# Patient Record
Sex: FEMALE | Race: WHITE | NOT HISPANIC OR LATINO | URBAN - METROPOLITAN AREA
[De-identification: names, ages, dates, MRNs, and addresses within clinical notes are randomized per-mention and may not be internally consistent; named-entity substitution may affect disease eponyms.]

---

## 2018-02-09 ENCOUNTER — COMMUNICATION - HEALTHEAST (OUTPATIENT)
Dept: FAMILY MEDICINE | Facility: CLINIC | Age: 43
End: 2018-02-09

## 2018-02-09 ENCOUNTER — OFFICE VISIT - HEALTHEAST (OUTPATIENT)
Dept: FAMILY MEDICINE | Facility: CLINIC | Age: 43
End: 2018-02-09

## 2018-02-09 DIAGNOSIS — Z13.1 SCREENING FOR DIABETES MELLITUS (DM): ICD-10-CM

## 2018-02-09 DIAGNOSIS — Z12.31 VISIT FOR SCREENING MAMMOGRAM: ICD-10-CM

## 2018-02-09 DIAGNOSIS — Z00.00 ROUTINE GENERAL MEDICAL EXAMINATION AT A HEALTH CARE FACILITY: ICD-10-CM

## 2018-02-09 DIAGNOSIS — Z13.220 SCREENING FOR LIPID DISORDERS: ICD-10-CM

## 2018-02-09 DIAGNOSIS — Z30.011 ENCOUNTER FOR INITIAL PRESCRIPTION OF CONTRACEPTIVE PILLS: ICD-10-CM

## 2018-02-09 DIAGNOSIS — L98.9 SKIN LESION: ICD-10-CM

## 2018-02-09 DIAGNOSIS — Z12.4 SCREENING FOR CERVICAL CANCER: ICD-10-CM

## 2018-02-09 LAB
HCG UR QL: NEGATIVE
SP GR UR STRIP: 1.02 (ref 1–1.03)

## 2018-02-09 ASSESSMENT — MIFFLIN-ST. JEOR: SCORE: 1296.77

## 2018-02-12 ENCOUNTER — COMMUNICATION - HEALTHEAST (OUTPATIENT)
Dept: FAMILY MEDICINE | Facility: CLINIC | Age: 43
End: 2018-02-12

## 2018-02-12 ENCOUNTER — AMBULATORY - HEALTHEAST (OUTPATIENT)
Dept: LAB | Facility: CLINIC | Age: 43
End: 2018-02-12

## 2018-02-12 DIAGNOSIS — Z13.220 SCREENING FOR LIPID DISORDERS: ICD-10-CM

## 2018-02-12 DIAGNOSIS — Z13.1 SCREENING FOR DIABETES MELLITUS (DM): ICD-10-CM

## 2018-02-12 LAB
CHOLEST SERPL-MCNC: 138 MG/DL
FASTING STATUS PATIENT QL REPORTED: YES
FASTING STATUS PATIENT QL REPORTED: YES
GLUCOSE BLD-MCNC: 95 MG/DL (ref 70–99)
HDLC SERPL-MCNC: 57 MG/DL
HPV SOURCE: NORMAL
HUMAN PAPILLOMA VIRUS 16 DNA: NEGATIVE
HUMAN PAPILLOMA VIRUS 18 DNA: NEGATIVE
HUMAN PAPILLOMA VIRUS FINAL DIAGNOSIS: NORMAL
HUMAN PAPILLOMA VIRUS OTHER HR: NEGATIVE
LDLC SERPL CALC-MCNC: 69 MG/DL
SPECIMEN DESCRIPTION: NORMAL
TRIGL SERPL-MCNC: 59 MG/DL

## 2018-02-14 ENCOUNTER — RECORDS - HEALTHEAST (OUTPATIENT)
Dept: MAMMOGRAPHY | Facility: CLINIC | Age: 43
End: 2018-02-14

## 2018-02-14 DIAGNOSIS — Z12.31 ENCOUNTER FOR SCREENING MAMMOGRAM FOR MALIGNANT NEOPLASM OF BREAST: ICD-10-CM

## 2018-02-15 LAB
BKR LAB AP ABNORMAL BLEEDING: NO
BKR LAB AP BIRTH CONTROL/HORMONES: NORMAL
BKR LAB AP CERVICAL APPEARANCE: NORMAL
BKR LAB AP GYN ADEQUACY: NORMAL
BKR LAB AP GYN INTERPRETATION: NORMAL
BKR LAB AP GYN OTHER FINDINGS: NORMAL
BKR LAB AP HPV REFLEX: NORMAL
BKR LAB AP LMP: NORMAL
BKR LAB AP PATIENT STATUS: NORMAL
BKR LAB AP PREVIOUS ABNORMAL: NO
BKR LAB AP PREVIOUS NORMAL: 2012
HIGH RISK?: NO
PATH REPORT.COMMENTS IMP SPEC: NORMAL
RESULT FLAG (HE HISTORICAL CONVERSION): NORMAL

## 2018-03-19 ENCOUNTER — HOSPITAL ENCOUNTER (OUTPATIENT)
Dept: MAMMOGRAPHY | Facility: CLINIC | Age: 43
Discharge: HOME | End: 2018-03-19
Attending: FAMILY MEDICINE

## 2018-03-19 DIAGNOSIS — N64.89 BREAST ASYMMETRY: ICD-10-CM

## 2019-01-11 ENCOUNTER — COMMUNICATION - HEALTHEAST (OUTPATIENT)
Dept: FAMILY MEDICINE | Facility: CLINIC | Age: 44
End: 2019-01-11

## 2019-04-03 ENCOUNTER — COMMUNICATION - HEALTHEAST (OUTPATIENT)
Dept: FAMILY MEDICINE | Facility: CLINIC | Age: 44
End: 2019-04-03

## 2019-05-28 ENCOUNTER — COMMUNICATION - HEALTHEAST (OUTPATIENT)
Dept: FAMILY MEDICINE | Facility: CLINIC | Age: 44
End: 2019-05-28

## 2019-06-07 ENCOUNTER — OFFICE VISIT - HEALTHEAST (OUTPATIENT)
Dept: FAMILY MEDICINE | Facility: CLINIC | Age: 44
End: 2019-06-07

## 2019-06-07 DIAGNOSIS — G43.829 MENSTRUAL MIGRAINE WITHOUT STATUS MIGRAINOSUS, NOT INTRACTABLE: ICD-10-CM

## 2019-06-07 DIAGNOSIS — Z30.011 ENCOUNTER FOR INITIAL PRESCRIPTION OF CONTRACEPTIVE PILLS: ICD-10-CM

## 2019-06-07 RX ORDER — METOCLOPRAMIDE 10 MG/1
10 TABLET ORAL DAILY PRN
Status: SHIPPED | COMMUNITY
Start: 2019-06-07

## 2019-06-07 RX ORDER — SUMATRIPTAN 50 MG/1
50 TABLET, FILM COATED ORAL
Status: SHIPPED | COMMUNITY
Start: 2019-06-07

## 2019-06-07 ASSESSMENT — MIFFLIN-ST. JEOR: SCORE: 1301.76

## 2020-06-24 ENCOUNTER — COMMUNICATION - HEALTHEAST (OUTPATIENT)
Dept: FAMILY MEDICINE | Facility: CLINIC | Age: 45
End: 2020-06-24

## 2020-06-24 DIAGNOSIS — G43.829 MENSTRUAL MIGRAINE WITHOUT STATUS MIGRAINOSUS, NOT INTRACTABLE: ICD-10-CM

## 2020-06-24 DIAGNOSIS — Z30.011 ENCOUNTER FOR INITIAL PRESCRIPTION OF CONTRACEPTIVE PILLS: ICD-10-CM

## 2020-06-30 ENCOUNTER — COMMUNICATION - HEALTHEAST (OUTPATIENT)
Dept: FAMILY MEDICINE | Facility: CLINIC | Age: 45
End: 2020-06-30

## 2020-06-30 DIAGNOSIS — G43.829 MENSTRUAL MIGRAINE WITHOUT STATUS MIGRAINOSUS, NOT INTRACTABLE: ICD-10-CM

## 2020-06-30 DIAGNOSIS — Z30.011 ENCOUNTER FOR INITIAL PRESCRIPTION OF CONTRACEPTIVE PILLS: ICD-10-CM

## 2020-07-17 ENCOUNTER — COMMUNICATION - HEALTHEAST (OUTPATIENT)
Dept: FAMILY MEDICINE | Facility: CLINIC | Age: 45
End: 2020-07-17

## 2020-07-20 ENCOUNTER — OFFICE VISIT - HEALTHEAST (OUTPATIENT)
Dept: FAMILY MEDICINE | Facility: CLINIC | Age: 45
End: 2020-07-20

## 2020-07-20 DIAGNOSIS — R19.8 ABDOMINAL FULLNESS: ICD-10-CM

## 2020-07-20 DIAGNOSIS — Z13.1 ENCOUNTER FOR SCREENING FOR DIABETES MELLITUS: ICD-10-CM

## 2020-07-20 DIAGNOSIS — Z00.00 ROUTINE GENERAL MEDICAL EXAMINATION AT A HEALTH CARE FACILITY: ICD-10-CM

## 2020-07-20 DIAGNOSIS — Z13.220 ENCOUNTER FOR SCREENING FOR LIPOID DISORDERS: ICD-10-CM

## 2020-07-20 DIAGNOSIS — R14.0 ABDOMINAL BLOATING: ICD-10-CM

## 2020-07-20 DIAGNOSIS — R63.5 WEIGHT GAIN: ICD-10-CM

## 2020-07-20 DIAGNOSIS — Z12.31 VISIT FOR SCREENING MAMMOGRAM: ICD-10-CM

## 2020-07-20 LAB
ALBUMIN SERPL-MCNC: 4.2 G/DL (ref 3.5–5)
ALBUMIN SERPL-MCNC: 4.2 G/DL (ref 3.5–5)
ALP SERPL-CCNC: 36 U/L (ref 45–120)
ALP SERPL-CCNC: 36 U/L (ref 45–120)
ALT SERPL W P-5'-P-CCNC: 30 U/L (ref 0–45)
ALT SERPL W P-5'-P-CCNC: 30 U/L (ref 0–45)
AMYLASE SERPL-CCNC: 61 U/L (ref 5–120)
ANION GAP SERPL CALCULATED.3IONS-SCNC: 10 MMOL/L (ref 5–18)
AST SERPL W P-5'-P-CCNC: 24 U/L (ref 0–40)
AST SERPL W P-5'-P-CCNC: 24 U/L (ref 0–40)
BILIRUB DIRECT SERPL-MCNC: 0.2 MG/DL
BILIRUB SERPL-MCNC: 0.4 MG/DL (ref 0–1)
BILIRUB SERPL-MCNC: 0.4 MG/DL (ref 0–1)
BUN SERPL-MCNC: 11 MG/DL (ref 8–22)
CALCIUM SERPL-MCNC: 9.4 MG/DL (ref 8.5–10.5)
CHLORIDE BLD-SCNC: 105 MMOL/L (ref 98–107)
CHOLEST SERPL-MCNC: 157 MG/DL
CO2 SERPL-SCNC: 22 MMOL/L (ref 22–31)
CREAT SERPL-MCNC: 0.82 MG/DL (ref 0.6–1.1)
ERYTHROCYTE [DISTWIDTH] IN BLOOD BY AUTOMATED COUNT: 11 % (ref 11–14.5)
FASTING STATUS PATIENT QL REPORTED: YES
GFR SERPL CREATININE-BSD FRML MDRD: >60 ML/MIN/1.73M2
GLUCOSE BLD-MCNC: 87 MG/DL (ref 70–125)
HCT VFR BLD AUTO: 40.8 % (ref 35–47)
HDLC SERPL-MCNC: 56 MG/DL
HGB BLD-MCNC: 14 G/DL (ref 12–16)
HIV 1+2 AB+HIV1 P24 AG SERPL QL IA: NEGATIVE
LDLC SERPL CALC-MCNC: 90 MG/DL
LIPASE SERPL-CCNC: 16 U/L (ref 0–52)
MCH RBC QN AUTO: 30.1 PG (ref 27–34)
MCHC RBC AUTO-ENTMCNC: 34.3 G/DL (ref 32–36)
MCV RBC AUTO: 88 FL (ref 80–100)
PLATELET # BLD AUTO: 291 THOU/UL (ref 140–440)
PMV BLD AUTO: 8.2 FL (ref 7–10)
POTASSIUM BLD-SCNC: 4.1 MMOL/L (ref 3.5–5)
PROT SERPL-MCNC: 7 G/DL (ref 6–8)
PROT SERPL-MCNC: 7 G/DL (ref 6–8)
RBC # BLD AUTO: 4.66 MILL/UL (ref 3.8–5.4)
SODIUM SERPL-SCNC: 137 MMOL/L (ref 136–145)
TRIGL SERPL-MCNC: 53 MG/DL
TSH SERPL DL<=0.005 MIU/L-ACNC: 1.59 UIU/ML (ref 0.3–5)
WBC: 7.6 THOU/UL (ref 4–11)

## 2020-07-20 ASSESSMENT — MIFFLIN-ST. JEOR: SCORE: 1352.68

## 2020-07-23 ENCOUNTER — AMBULATORY - HEALTHEAST (OUTPATIENT)
Dept: FAMILY MEDICINE | Facility: CLINIC | Age: 45
End: 2020-07-23

## 2020-07-23 DIAGNOSIS — D21.9 FIBROID: ICD-10-CM

## 2020-07-28 ENCOUNTER — HOSPITAL ENCOUNTER (OUTPATIENT)
Dept: ULTRASOUND IMAGING | Facility: HOSPITAL | Age: 45
Discharge: HOME | End: 2020-07-28
Attending: FAMILY MEDICINE

## 2020-07-28 DIAGNOSIS — D21.9 FIBROID: ICD-10-CM

## 2020-08-03 ENCOUNTER — AMBULATORY - HEALTHEAST (OUTPATIENT)
Dept: FAMILY MEDICINE | Facility: CLINIC | Age: 45
End: 2020-08-03

## 2020-08-03 DIAGNOSIS — D25.9 UTERINE LEIOMYOMA, UNSPECIFIED LOCATION: ICD-10-CM

## 2020-08-20 ENCOUNTER — AMBULATORY - HEALTHEAST (OUTPATIENT)
Dept: SURGERY | Facility: HOSPITAL | Age: 45
End: 2020-08-20

## 2020-08-20 DIAGNOSIS — Z11.59 ENCOUNTER FOR SCREENING FOR OTHER VIRAL DISEASES: ICD-10-CM

## 2020-09-13 ENCOUNTER — VIRTUAL VISIT (OUTPATIENT)
Dept: FAMILY MEDICINE | Facility: OTHER | Age: 45
End: 2020-09-13
Payer: COMMERCIAL

## 2020-09-13 PROCEDURE — 99421 OL DIG E/M SVC 5-10 MIN: CPT | Performed by: PHYSICIAN ASSISTANT

## 2020-09-13 NOTE — PROGRESS NOTES
"Date: 2020 17:21:17  Clinician: Cristofer Coello  Clinician NPI: [id number]  Patient: PEDRO FELIX  Patient : 1975  Patient Address: [address] [place] [address]  Patient Phone: [phone]  Visit Protocol: JANET  Patient Summary:  PEDRO is a 45 year old ( : 1975 ) female who initiated a [facility] Visit for COVID-19 (Coronavirus) evaluation and screening. When asked the question \"Please sign me up to receive news, health information and promotions from [facility].\", PEDRO responded \"Yes\".    PEDRO states her symptoms started 1-2 days ago.   Her symptoms consist of a sore throat and a cough.   Symptom details     Cough: PEDRO coughs a few times an hour and her cough is not more bothersome at night. Phlegm does not come into her throat when she coughs. She believes her cough is caused by post-nasal drip.     Sore throat: PEDRO reports having mild throat pain (1-3 on a 10 point pain scale), does not have exudate on her tonsils, and can swallow liquids. She is not sure if the lymph nodes in her neck are enlarged. A rash has not appeared on the skin since the sore throat started.      PEDRO denies having ear pain, anosmia, facial pain or pressure, fever, vomiting, rhinitis, nausea, wheezing, teeth pain, ageusia, diarrhea, nasal congestion, headache, chills, malaise, and myalgias. She also denies taking antibiotic medication in the past month and having recent facial or sinus surgery in the past 60 days. She is not experiencing dyspnea.   Precipitating events  Within the past week, PEDRO has not been exposed to someone with strep throat. She has not recently been exposed to someone with influenza. PEDRO has not been in close contact with any high risk individuals.   Pertinent COVID-19 (Coronavirus) information  In the past 14 days, PEDRO has not worked in a congregate living setting.   She does not work or volunteer as healthcare worker or a  and does not work or volunteer in a healthcare " facility.   PEDRO also has not lived in a congregate living setting in the past 14 days. She does not live with a healthcare worker.   PEDRO has not had a close contact with a laboratory-confirmed COVID-19 patient within 14 days of symptom onset.   Since December 2019, PEDRO and has not had upper respiratory infection or influenza-like illness. Has not been diagnosed with lab-confirmed COVID-19 test   Pertinent medical history  PEDRO does not get yeast infections when she takes antibiotics.   PEDRO does not need a return to work/school note.   Weight: 144 lbs   PEDRO does not smoke or use smokeless tobacco.   She denies pregnancy and denies breastfeeding. She has menstruated in the past month.   Weight: 144 lbs    MEDICATIONS: levonorgestrel-ethinyl estradiol oral, sumatriptan-naproxen oral, ALLERGIES: Penicillins  Clinician Response:  Dear PEDRO,   Your symptoms show that you may have coronavirus (COVID-19). This illness can cause fever, cough and trouble breathing. Many people get a mild case and get better on their own. Some people can get very sick.  What should I do?  We would like to test you for this virus.   1. Please call [phone] to schedule your visit. Explain that you were referred by [facility] to have a COVID-19 test. Be ready to share your [facility] visit ID number.  The following will serve as your written order for this COVID Test, ordered by me, for the indication of suspected COVID [Z20.828]: The test will be ordered in [facility], our electronic health record, after you are scheduled. It will show as ordered and authorized by Raleigh Ibarra MD.  Order: COVID-19 (Coronavirus) PCR for SYMPTOMATIC testing from [facility].      2. When it's time for your COVID test:  Stay at least 6 feet away from others. (If someone will drive you to your test, stay in the backseat, as far away from the  as you can.)   Cover your mouth and nose with a mask, tissue or washcloth.  Go straight to the testing site. Don't make any stops  "on the way there or back.      3.Starting now: Stay home and away from others (self-isolate) until:   You've had no fever---and no medicine that reduces fever---for one full day (24 hours). And...   Your other symptoms have gotten better. For example, your cough or breathing has improved. And...   At least 10 days have passed since your symptoms started.       During this time, don't leave the house except for testing or medical care.   Stay in your own room, even for meals. Use your own bathroom if you can.   Stay away from others in your home. No hugging, kissing or shaking hands. No visitors.  Don't go to work, school or anywhere else.    Clean \"high touch\" surfaces often (doorknobs, counters, handles, etc.). Use a household cleaning spray or wipes. You'll find a full list of  on the EPA website: www.epa.gov/pesticide-registration/list-n-disinfectants-use-against-sars-cov-2.   Cover your mouth and nose with a mask, tissue or washcloth to avoid spreading germs.  Wash your hands and face often. Use soap and water.  Caregivers in these groups are at risk for severe illness due to COVID-19:  o People 65 years and older  o People who live in a nursing home or long-term care facility  o People with chronic disease (lung, heart, cancer, diabetes, kidney, liver, immunologic)  o People who have a weakened immune system, including those who:   Are in cancer treatment  Take medicine that weakens the immune system, such as corticosteroids  Had a bone marrow or organ transplant  Have an immune deficiency  Have poorly controlled HIV or AIDS  Are obese (body mass index of 40 or higher)  Smoke regularly   o Caregivers should wear gloves while washing dishes, handling laundry and cleaning bedrooms and bathrooms.  o Use caution when washing and drying laundry: Don't shake dirty laundry, and use the warmest water setting that you can.  o For more tips, go to www.cdc.gov/coronavirus/2019-ncov/downloads/10Things.pdf.    How " can I take care of myself?    Get lots of rest. Drink extra fluids (unless a doctor has told you not to).   Take Tylenol (acetaminophen) for fever or pain. If you have liver or kidney problems, ask your family doctor if it's okay to take Tylenol.   Adults can take either:    650 mg (two 325 mg pills) every 4 to 6 hours, or...   1,000 mg (two 500 mg pills) every 8 hours as needed.    Note: Don't take more than 3,000 mg in one day. Acetaminophen is found in many medicines (both prescribed and over-the-counter medicines). Read all labels to be sure you don't take too much.   For children, check the Tylenol bottle for the right dose. The dose is based on the child's age or weight.    If you have other health problems (like cancer, heart failure, an organ transplant or severe kidney disease): Call your specialty clinic if you don't feel better in the next 2 days.       Know when to call 911. Emergency warning signs include:    Trouble breathing or shortness of breath Pain or pressure in the chest that doesn't go away Feeling confused like you haven't felt before, or not being able to wake up Bluish-colored lips or face.  Where can I get more information?   [facility] -- About COVID-19: [email or web address]   CDC -- What to Do If You're Sick: www.cdc.gov/coronavirus/2019-ncov/about/steps-when-sick.html   CDC -- Ending Home Isolation: www.cdc.gov/coronavirus/2019-ncov/hcp/disposition-in-home-patients.html   CDC -- Caring for Someone: www.cdc.gov/coronavirus/2019-ncov/if-you-are-sick/care-for-someone.html   [facility] -- Interim Guidance for Hospital Discharge to Home: www.health.[facility].[place]/diseases/coronavirus/hcp/hospdischarge.pdf   [facility] clinical trials (COVID-19 research studies): [email or web address].[email or web address]/umn-clinical-trials    Below are the COVID-19 hotlines at the [place] Department of Health ([facility]). Interpreters are available.    For health questions: Call 334-735-5405 or 1-671.631.4303  (7 a.m. to 7 p.m.) For questions about schools and childcare: Call 354-884-8789 or 1-931.779.2520 (7 a.m. to 7 p.m.)    Diagnosis: Acute pharyngitis, unspecified  Diagnosis ICD: J02.9

## 2020-09-14 ENCOUNTER — AMBULATORY - HEALTHEAST (OUTPATIENT)
Dept: FAMILY MEDICINE | Facility: CLINIC | Age: 45
End: 2020-09-14

## 2020-09-14 ENCOUNTER — AMBULATORY - HEALTHEAST (OUTPATIENT)
Dept: INTERNAL MEDICINE | Facility: CLINIC | Age: 45
End: 2020-09-14

## 2020-09-14 DIAGNOSIS — Z20.822 SUSPECTED 2019 NOVEL CORONAVIRUS INFECTION: ICD-10-CM

## 2020-09-17 ENCOUNTER — COMMUNICATION - HEALTHEAST (OUTPATIENT)
Dept: SCHEDULING | Facility: CLINIC | Age: 45
End: 2020-09-17

## 2020-09-26 ENCOUNTER — COMMUNICATION - HEALTHEAST (OUTPATIENT)
Dept: FAMILY MEDICINE | Facility: CLINIC | Age: 45
End: 2020-09-26

## 2020-09-26 DIAGNOSIS — Z30.011 ENCOUNTER FOR INITIAL PRESCRIPTION OF CONTRACEPTIVE PILLS: ICD-10-CM

## 2020-09-26 DIAGNOSIS — G43.829 MENSTRUAL MIGRAINE WITHOUT STATUS MIGRAINOSUS, NOT INTRACTABLE: ICD-10-CM

## 2020-10-03 ENCOUNTER — COMMUNICATION - HEALTHEAST (OUTPATIENT)
Dept: FAMILY MEDICINE | Facility: CLINIC | Age: 45
End: 2020-10-03

## 2020-10-19 ENCOUNTER — COMMUNICATION - HEALTHEAST (OUTPATIENT)
Dept: FAMILY MEDICINE | Facility: CLINIC | Age: 45
End: 2020-10-19

## 2020-10-20 ENCOUNTER — OFFICE VISIT - HEALTHEAST (OUTPATIENT)
Dept: FAMILY MEDICINE | Facility: CLINIC | Age: 45
End: 2020-10-20

## 2020-10-20 ENCOUNTER — COMMUNICATION - HEALTHEAST (OUTPATIENT)
Dept: FAMILY MEDICINE | Facility: CLINIC | Age: 45
End: 2020-10-20

## 2020-10-20 DIAGNOSIS — E87.5 HYPERKALEMIA: ICD-10-CM

## 2020-10-20 DIAGNOSIS — Z23 NEED FOR VACCINATION: ICD-10-CM

## 2020-10-20 DIAGNOSIS — Z01.818 PREOPERATIVE EXAMINATION: ICD-10-CM

## 2020-10-20 DIAGNOSIS — D21.9 FIBROID: ICD-10-CM

## 2020-10-20 LAB
CREAT SERPL-MCNC: 0.8 MG/DL (ref 0.6–1.1)
GFR SERPL CREATININE-BSD FRML MDRD: >60 ML/MIN/1.73M2
HGB BLD-MCNC: 15.2 G/DL (ref 12–16)
POTASSIUM BLD-SCNC: 5.2 MMOL/L (ref 3.5–5)

## 2020-10-20 ASSESSMENT — MIFFLIN-ST. JEOR: SCORE: 1381.26

## 2020-10-26 ENCOUNTER — AMBULATORY - HEALTHEAST (OUTPATIENT)
Dept: LAB | Facility: CLINIC | Age: 45
End: 2020-10-26

## 2020-10-26 DIAGNOSIS — E87.5 HYPERKALEMIA: ICD-10-CM

## 2020-10-26 DIAGNOSIS — Z11.59 ENCOUNTER FOR SCREENING FOR OTHER VIRAL DISEASES: ICD-10-CM

## 2020-10-26 LAB — POTASSIUM BLD-SCNC: 4.2 MMOL/L (ref 3.5–5)

## 2020-10-27 ASSESSMENT — MIFFLIN-ST. JEOR: SCORE: 1359.82

## 2020-10-28 ENCOUNTER — ANESTHESIA - HEALTHEAST (OUTPATIENT)
Dept: SURGERY | Facility: HOSPITAL | Age: 45
End: 2020-10-28

## 2020-10-28 ENCOUNTER — COMMUNICATION - HEALTHEAST (OUTPATIENT)
Dept: SCHEDULING | Facility: CLINIC | Age: 45
End: 2020-10-28

## 2020-10-29 ENCOUNTER — SURGERY - HEALTHEAST (OUTPATIENT)
Dept: SURGERY | Facility: HOSPITAL | Age: 45
End: 2020-10-29

## 2021-05-27 NOTE — TELEPHONE ENCOUNTER
RN cannot approve Refill Request    RN can NOT refill this medication overdue for office visits and/or labs.    Sandeep Muhammad, [facility] Connection Triage/Med Refill 4/4/2019    Requested Prescriptions   Pending Prescriptions Disp Refills     norethindrone-ethinyl estradiol (MICROGESTIN FE 1/20) 1 mg-20 mcg (21)/75 mg (7) per tablet 84 tablet 0     Sig: Take 1 tablet by mouth daily.    Oral Contraceptives Protocol Failed - 4/3/2019  3:05 PM       Failed - Visit with PCP or prescribing provider visit in last 12 months     Last office visit with prescriber/PCP: Visit date not found OR same dept: Visit date not found OR same specialty: Visit date not found  Last physical: 2/9/2018 Last MTM visit: Visit date not found   Next visit within 3 mo: Visit date not found  Next physical within 3 mo: Visit date not found  Prescriber OR PCP: Rosaura Mullen MD  Last diagnosis associated with med order: 1. Contraception  - norethindrone-ethinyl estradiol (MICROGESTIN FE 1/20) 1 mg-20 mcg (21)/75 mg (7) per tablet; Take 1 tablet by mouth daily.  Dispense: 84 tablet; Refill: 0    If protocol passes may refill for 12 months if within 3 months of last provider visit (or a total of 15 months).

## 2021-05-27 NOTE — TELEPHONE ENCOUNTER
Rx signed, but haven't seen patient since Feb 2018. Needs appointment before additional refills.    Please assist pt in scheduling physical.

## 2021-05-27 NOTE — TELEPHONE ENCOUNTER
Left message to call back for: stephanie  Information to relay to patient:  Schedule appointment prior to anymore refills

## 2021-05-29 NOTE — TELEPHONE ENCOUNTER
RN cannot approve Refill Request    RN can NOT refill this medication PCP messaged that patient is overdue for Office Visit and Physical .     Jeanne Bae, [facility] Connection Triage/Med Refill 5/28/2019    Requested Prescriptions   Pending Prescriptions Disp Refills     norethindrone-ethinyl estradiol (MICROGESTIN FE 1/20) 1 mg-20 mcg (21)/75 mg (7) per tablet 28 tablet 0     Sig: Take 1 tablet by mouth daily. Needs appointment before additional refills.       Oral Contraceptives Protocol Failed - 5/28/2019  4:23 PM        Failed - Visit with PCP or prescribing provider visit in last 12 months      Last office visit with prescriber/PCP: Visit date not found OR same dept: Visit date not found OR same specialty: Visit date not found  Last physical: 2/9/2018 Last MTM visit: Visit date not found   Next visit within 3 mo: Visit date not found  Next physical within 3 mo: Visit date not found  Prescriber OR PCP: Rosaura Mullen MD  Last diagnosis associated with med order: 1. Contraception  - norethindrone-ethinyl estradiol (MICROGESTIN FE 1/20) 1 mg-20 mcg (21)/75 mg (7) per tablet; Take 1 tablet by mouth daily. Needs appointment before additional refills.  Dispense: 28 tablet; Refill: 0    If protocol passes may refill for 12 months if within 3 months of last provider visit (or a total of 15 months).

## 2021-05-29 NOTE — TELEPHONE ENCOUNTER
1st attempt  Tried to call patient, n/a no way to leave message  Please advise patient as below, needs an appointment with Dr. Mullen before refills will be sent    Julianna Gallardo, CMA

## 2021-05-29 NOTE — TELEPHONE ENCOUNTER
Called - patient notified reason for declining medication refill  and verbalized understanding. Patient scheduled for a follow up appt with Dr. Mullen on Friday 6/7/19 at 11:50 am per her request.    Trish Seay CMA

## 2021-05-29 NOTE — PROGRESS NOTES
"Assessment / Impression     1. Encounter for initial prescription of contraceptive pills  levonorgestrel-ethinyl estradiol (SEASONALE) 0.15 mg-30 mcg (91) per tablet   2. Menstrual migraine without status migrainosus, not intractable  levonorgestrel-ethinyl estradiol (SEASONALE) 0.15 mg-30 mcg (91) per tablet         Plan:     I discussed with patient various contraception options, and that we could try continuous OCPs such as Seasonale.  Discussed use, possible side effects of medication, including small but increased risk of blood clot.  Patient can let me know whether this improves and decreases the frequency of migraine headaches.  In regard to migraine treatment, she can continue Imitrex as needed.    Return in about 6 months (around 12/7/2019) for Annual physical.    Subjective:      HPI: Marilyn Neely is a 44 y.o. female who presents for requesting possible change of OCPs.  She had previously been on Microgestin, though had been off of medication for about 1 month.  She initially had started OCP to see if they would help with her migraines, however, even though she was still getting a period every month, she tended to have migraines usually a few days before or after her menses.  She did not find it actually helping her symptoms by being on OCPs alone, though was wondering if she were on OCP continuously whether this would help her migraines.    She is also participated in some online migraine treatment called \"core\" where she had been given a prescription for sumatriptan 50 mg to use for migraine treatment as needed, though she had taken half a tablet (25 mg) and that alone helped her symptoms anytime she did have a migraine.  For example she did use it twice in the month of May.  She is also given a prescription for metoclopramide to be used as needed for nausea, though she has not needed to use medication.  She did this mostly because she was tired of taking ibuprofen all the time.      Medical History: " "    Patient Active Problem List   Diagnosis     Female Infertility     Acute appendicitis     Acute appendicitis with localized peritonitis     S/P laparoscopic appendectomy       Past Medical History:   Diagnosis Date     Fibrocystic breast      Hx of ovarian cyst      Migraine      Motion sickness        Past Surgical History:   Procedure Laterality Date     APPENDECTOMY        SECTION       LAPAROSCOPIC APPENDECTOMY N/A 2016    Procedure: APPENDECTOMY LAPAROSCOPIC;  Surgeon: Adam Do MD;  Location: [facility] OR;  Service:        Current Medications:     Current Outpatient Medications   Medication Sig     metoclopramide (REGLAN) 10 MG tablet Take 10 mg by mouth daily as needed for nausea.     SUMAtriptan (IMITREX) 50 MG tablet Take 50 mg by mouth every 2 (two) hours as needed.     ibuprofen (ADVIL,MOTRIN) 200 MG tablet Take 400-600 mg by mouth every 6 (six) hours as needed for pain or headaches.     levonorgestrel-ethinyl estradiol (SEASONALE) 0.15 mg-30 mcg (91) per tablet Take 1 tablet by mouth daily.       Family History:     Family History   Problem Relation Age of Onset     Diabetes type II Father      Melanoma Father      Breast cancer Maternal Grandfather         diagnosed later in life     Breast cancer Paternal Aunt         diagnosed in her 50's       Review of Systems  All other systems reviewed and are negative.         Social History:     Social History     Tobacco Use   Smoking Status Never Smoker   Smokeless Tobacco Never Used     Social History     Social History Narrative    , lives with  Waqas and daughter savannah (3).  Works full-time as .           Objective:     /70 (Patient Site: Left Arm, Patient Position: Sitting, Cuff Size: Adult Regular)   Pulse 68   Temp 98.6  F (37  C) (Oral)   Resp 14   Ht 5' 8.4\" (1.737 m)   Wt 133 lb 12.8 oz (60.7 kg)   LMP 2019 (Exact Date)   Breastfeeding? No   BMI 20.11 kg/m    Physical " Examination: General appearance - alert, well appearing, and in no distress  Eyes: pupils equal and reactive, extraocular eye movements intact  Mouth: mucous membranes moist, pharynx normal without lesions  Neck: supple, no significant adenopathy or thyromegaly  Lungs: clear to auscultation, no wheezes, rales or rhonchi, symmetric air entry  Heart: normal rate, regular rhythm, normal S1, S2, no murmurs.  Abdomen: soft, nontender, nondistended, no masses or organomegaly  Neurological: alert, oriented, normal speech, no focal findings or movement disorder noted.  Normal finger-to-nose test bilaterally.  Normal rapid hand movements.  Normal heel-to-shin test bilaterally.  2+ DTRs patellar tendons bilaterally.  Visual fields intact.  Extremities: No edema, no clubbing or cyanosis  Psychiatric: Normal affect. Does not appear anxious or depressed.    No results found for this or any previous visit (from the past 168 hour(s)).      Rosaura Mullen MD  6/7/2019  4:13 PM

## 2021-05-31 VITALS — HEIGHT: 68 IN | BODY MASS INDEX: 20.11 KG/M2 | WEIGHT: 132.7 LBS

## 2021-06-02 VITALS — HEIGHT: 68 IN | WEIGHT: 133.8 LBS | BODY MASS INDEX: 20.28 KG/M2

## 2021-06-04 VITALS
BODY MASS INDEX: 22.23 KG/M2 | DIASTOLIC BLOOD PRESSURE: 76 MMHG | HEIGHT: 69 IN | SYSTOLIC BLOOD PRESSURE: 134 MMHG | OXYGEN SATURATION: 100 % | HEART RATE: 57 BPM | TEMPERATURE: 97 F | WEIGHT: 150.1 LBS | RESPIRATION RATE: 18 BRPM

## 2021-06-04 VITALS
DIASTOLIC BLOOD PRESSURE: 76 MMHG | HEIGHT: 69 IN | BODY MASS INDEX: 21.3 KG/M2 | HEART RATE: 61 BPM | OXYGEN SATURATION: 100 % | WEIGHT: 143.8 LBS | RESPIRATION RATE: 16 BRPM | TEMPERATURE: 98.5 F | SYSTOLIC BLOOD PRESSURE: 122 MMHG

## 2021-06-04 VITALS — WEIGHT: 148 LBS | HEIGHT: 68 IN | BODY MASS INDEX: 22.43 KG/M2

## 2021-06-09 NOTE — TELEPHONE ENCOUNTER
1 month rx signed. Needs appointment before additional refills as she hasn't been seen since June 2019. Please assist patient in scheduling VV appointment.

## 2021-06-09 NOTE — TELEPHONE ENCOUNTER
RN cannot approve Refill Request    RN can NOT refill this medication Protocol failed and NO refill given.       Jeanne Bae, [facility] Triage/Med Refill 6/24/2020    Requested Prescriptions   Pending Prescriptions Disp Refills     levonorgestrel-ethinyl estradiol (SEASONALE) 0.15 mg-30 mcg (91) per tablet 91 tablet 3     Sig: Take 1 tablet by mouth daily.       Oral Contraceptives Protocol Failed - 6/24/2020 10:13 AM        Failed - Visit with PCP or prescribing provider visit in last 12 months      Last office visit with prescriber/PCP: 6/7/2019 Rosaura Mullen MD OR same dept: Visit date not found OR same specialty: 6/7/2019 Rosaura Mullen MD  Last physical: 2/9/2018 Last MTM visit: Visit date not found   Next visit within 3 mo: Visit date not found  Next physical within 3 mo: Visit date not found  Prescriber OR PCP: Rosaura Mullen MD  Last diagnosis associated with med order: 1. Encounter for initial prescription of contraceptive pills  - levonorgestrel-ethinyl estradiol (SEASONALE) 0.15 mg-30 mcg (91) per tablet; Take 1 tablet by mouth daily.  Dispense: 91 tablet; Refill: 3    2. Menstrual migraine without status migrainosus, not intractable  - levonorgestrel-ethinyl estradiol (SEASONALE) 0.15 mg-30 mcg (91) per tablet; Take 1 tablet by mouth daily.  Dispense: 91 tablet; Refill: 3    If protocol passes may refill for 12 months if within 3 months of last provider visit (or a total of 15 months).

## 2021-06-09 NOTE — TELEPHONE ENCOUNTER
FYI - Status Update  Who is Calling: Patient  Update: Patient reports I am scheduled in July and would like medication coverage until then.   Okay to leave a detailed message?:  Yes

## 2021-06-09 NOTE — TELEPHONE ENCOUNTER
Appt. With Dr. Mullen 07/20/2020. Spoke with patient and verified she did get her refill til then.

## 2021-06-09 NOTE — TELEPHONE ENCOUNTER
Declined, rx already signed 6/25. Needs appointment before additional refills.    Please assist patient in scheduling VV or physical appointment if not already scheduled

## 2021-06-09 NOTE — TELEPHONE ENCOUNTER
1st attempt to contact patient      Left message to call back for: Marilyn    Information to relay to patient:  LMTCB    Patient has appt with Dr. Mullen on Monday    Please confirm appt date, time, location, mask and guest policy    Please complete travel screen (located in travel history on appt desk or in the purple shaded area on this encounter)    Please update appt note    Please close this encounter when done    Thank you!

## 2021-06-11 NOTE — TELEPHONE ENCOUNTER
Refill Approved    Rx renewed per Medication Renewal Policy. Medication was last renewed on 6/25/20.    Jeanne Bae, [facility] Triage/Med Refill 9/29/2020     Requested Prescriptions   Pending Prescriptions Disp Refills     levonorgestrel-ethinyl estradiol (SEASONALE) 0.15 mg-30 mcg (91) per tablet [Pharmacy Med Name: LEVONOR/ETH ESTRADIOL TABLETS] 91 tablet 0     Sig: TAKE 1 TABLET BY MOUTH DAILY       Oral Contraceptives Protocol Passed - 9/26/2020  3:32 AM        Passed - Visit with PCP or prescribing provider visit in last 12 months      Last office visit with prescriber/PCP: 6/7/2019 Rosaura Mullen MD OR same dept: Visit date not found OR same specialty: 6/7/2019 Rosaura Mullen MD  Last physical: 7/20/2020 Last MTM visit: Visit date not found   Next visit within 3 mo: Visit date not found  Next physical within 3 mo: Visit date not found  Prescriber OR PCP: Rosaura Mullen MD  Last diagnosis associated with med order: 1. Encounter for initial prescription of contraceptive pills  - levonorgestrel-ethinyl estradiol (SEASONALE) 0.15 mg-30 mcg (91) per tablet [Pharmacy Med Name: LEVONOR/ETH ESTRADIOL TABLETS]; Take 1 tablet by mouth daily.  Dispense: 91 tablet; Refill: 0    2. Menstrual migraine without status migrainosus, not intractable  - levonorgestrel-ethinyl estradiol (SEASONALE) 0.15 mg-30 mcg (91) per tablet [Pharmacy Med Name: LEVONOR/ETH ESTRADIOL TABLETS]; Take 1 tablet by mouth daily.  Dispense: 91 tablet; Refill: 0    If protocol passes may refill for 12 months if within 3 months of last provider visit (or a total of 15 months).

## 2021-06-12 NOTE — PROGRESS NOTES
Proposed Surgery/ Procedure: Hysterectomy   Date of Surgery/ Procedure: 10/29/20  Time of Surgery/ Procedure: unsure  Hospital/Surgical Facility: [facility]  Surgery Fax Number: Note does not need to be faxed, will be available electronically in Epic.  Primary Physician: Malkia Mullen MD  Type of Anesthesia Anticipated: [address], [address]  [facility] [address]  Dept: [phone]  Dept Fax: [phone]  Primary Provider: Malika Mullen MD  Pre-op Performing Provider: MALIKA MULLEN    PREOPERATIVE EVALUATION:  Today's date: 10/20/2020    Marilyn Neely is a 45 y.o. female who presents for a preoperative evaluation.    Surgical Information:  Surgery/Procedure: hysterectomy  Surgery Location: [facility]  Surgeon: Dr. Bah  Surgery Date: 10/29/20  Time of Surgery: unsure  Where patient plans to recover: At home with family  Fax number for surgical facility: Note does not need to be faxed, will be available electronically in Epic.    Type of Anesthesia Anticipated: General    Subjective     HPI related to upcoming procedure: 45 year old female here today for preop exam.  She has fibroids in uterus, with one sitting on bowels, which has made symptoms difficult.  Has decided to go ahead with hysterectomy to alleviate symptoms.    Preop Questions 10/18/2020   Have you ever had a heart attack or stroke? No   Have you ever had surgery on your heart or blood vessels, such as a stent placement, a coronary artery bypass, or surgery on an artery in your head, neck, heart, or legs? No   Do you have chest pain with activity? No   Do you have a history of  heart failure? No   Do you currently have a cold, bronchitis or symptoms of other infection? No   Do you have a cough, shortness of breath, or wheezing? No   Do you or anyone in your family have previous history of blood clots? No   Do you or does anyone in your family have a serious bleeding problem  such as prolonged bleeding following surgeries or cuts? No   Have you ever had problems with anemia or been told to take iron pills? No   Have you had any abnormal blood loss such as black, tarry or bloody stools, or abnormal vaginal bleeding? No   Have you ever had a blood transfusion? No   Are you willing to have a blood transfusion if it is medically needed before, during, or after your surgery? Yes   Have you or any of your relatives ever had problems with anesthesia? YES - significant vomiting after anesthesia   Do you have sleep apnea, excessive snoring or daytime drowsiness? No   Do you have any artifical heart valves or other implanted medical devices like a pacemaker, defibrillator, or continuous glucose monitor? No   Do you have artificial joints? No   Are you allergic to latex? No   Is there any chance that you may be pregnant? No     Health Care Directive:  Patient does not have a Health Care Directive or Living Will: Patient states has Advance Directive and will bring in a copy to clinic.    Preoperative Review of :    reviewed - no record of controlled substances prescribed.    See problem list for active medical problems.  Problems all longstanding and stable, except as noted/documented.  See ROS for pertinent symptoms related to these conditions.      Review of Systems  CONSTITUTIONAL: NEGATIVE for fever, chills, change in weight  INTEGUMENTARY/SKIN: NEGATIVE for worrisome rashes, moles or lesions  EYES: NEGATIVE for vision changes or irritation  ENT/MOUTH: NEGATIVE for ear, mouth and throat problems  RESP: NEGATIVE for significant cough or SOB  BREAST: NEGATIVE for masses, tenderness or discharge  CV: NEGATIVE for chest pain, palpitations or peripheral edema  GI: NEGATIVE for nausea, abdominal pain, heartburn, or change in bowel habits  : NEGATIVE for frequency, dysuria, or hematuria  MUSCULOSKELETAL: NEGATIVE for significant arthralgias or myalgia  NEURO: NEGATIVE for weakness, dizziness or  paresthesias  ENDOCRINE: NEGATIVE for temperature intolerance, skin/hair changes  HEME: NEGATIVE for bleeding problems  PSYCHIATRIC: NEGATIVE for changes in mood or affect    Patient Active Problem List    Diagnosis Date Noted     S/P laparoscopic appendectomy      Acute appendicitis 2016     Acute appendicitis with localized peritonitis      Female Infertility      Past Medical History:   Diagnosis Date     Fibrocystic breast      Hx of ovarian cyst      Migraine      Motion sickness      Past Surgical History:   Procedure Laterality Date     APPENDECTOMY        SECTION       LAPAROSCOPIC APPENDECTOMY N/A 2016    Procedure: APPENDECTOMY LAPAROSCOPIC;  Surgeon: Adam Do MD;  Location: [facility];  Service:      Current Outpatient Medications   Medication Sig Dispense Refill     ibuprofen (ADVIL,MOTRIN) 200 MG tablet Take 400-600 mg by mouth every 6 (six) hours as needed for pain or headaches.       levonorgestrel-ethinyl estradiol (SEASONALE) 0.15 mg-30 mcg (91) per tablet TAKE 1 TABLET BY MOUTH DAILY 91 tablet 3     metoclopramide (REGLAN) 10 MG tablet Take 10 mg by mouth daily as needed for nausea.       SUMAtriptan (IMITREX) 50 MG tablet Take 50 mg by mouth every 2 (two) hours as needed.       No current facility-administered medications for this visit.        Allergies   Allergen Reactions     Penicillins Other (See Comments)     16: Patient unsure of allergy, denies anaphylaxis. Reports tolerating amoxicillin       Social History     Tobacco Use     Smoking status: Never Smoker     Smokeless tobacco: Never Used   Substance Use Topics     Alcohol use: Yes     Comment: 1-3 drinks a week      Family History   Problem Relation Age of Onset     Diabetes type II Father      Melanoma Father      Breast cancer Maternal Grandfather         diagnosed later in life     Breast cancer Paternal Aunt         diagnosed in her 50's     Social History     Substance and Sexual Activity  "  Drug Use No        Objective     /76 (Patient Site: Right Arm, Patient Position: Sitting, Cuff Size: Adult Regular)   Pulse (!) 57   Temp 97  F (36.1  C) (Tympanic)   Resp 18   Ht 5' 8.75\" (1.746 m)   Wt 150 lb 1.6 oz (68.1 kg)   LMP 09/15/2020 (Approximate)   SpO2 100%   Breastfeeding No   BMI 22.33 kg/m    Physical Exam    GENERAL APPEARANCE: healthy, alert and no distress     EYES: EOMI, PERRL     HENT: ear canals and TM's normal and nose and mouth without ulcers or lesions     NECK: no adenopathy, no asymmetry, masses, or scars and thyroid normal to palpation     RESP: lungs clear to auscultation - no rales, rhonchi or wheezes     BREAST: normal without masses, tenderness or nipple discharge and no palpable axillary masses or adenopathy     CV: regular rates and rhythm, normal S1 S2, no S3 or S4 and no murmur, click or rub     ABDOMEN:  soft, nontender, no HSM or masses and bowel sounds normal     MS: extremities normal- no gross deformities noted, no evidence of inflammation in joints, FROM in all extremities.     SKIN: no suspicious lesions or rashes     NEURO: Normal strength and tone, sensory exam grossly normal, mentation intact and speech normal     PSYCH: mentation appears normal. and affect normal/bright     LYMPHATICS: No cervical adenopathy    Recent Labs   Lab Test 07/20/20  1346   HGB 14.0         K 4.1   CREATININE 0.82        PRE-OP Diagnostics:   Recent Results (from the past 168 hour(s))   Hemoglobin    Collection Time: 10/20/20 10:58 AM   Result Value Ref Range    Hemoglobin 15.2 12.0 - 16.0 g/dL   Potassium    Collection Time: 10/20/20 10:58 AM   Result Value Ref Range    Potassium 5.2 (H) 3.5 - 5.0 mmol/L   Creatinine    Collection Time: 10/20/20 10:58 AM   Result Value Ref Range    Creatinine 0.80 0.60 - 1.10 mg/dL    GFR MDRD Af Amer >60 >60 mL/min/1.73m2    GFR MDRD Non Af Amer >60 >60 mL/min/1.73m2     No EKG required, no history of coronary heart disease, " significant arrhythmia, peripheral arterial disease or other structural heart disease.    REVISED CARDIAC RISK INDEX (RCRI)   The patient has the following serious cardiovascular risks for perioperative complications:   - No serious cardiac risks = 0 points    RCRI INTERPRETATION: 0 points: Class I (very low risk - 0.4% complication rate)         Assessment & Plan      The proposed surgical procedure is considered INTERMEDIATE risk.    1. Preoperative examination  - Hemoglobin  - Potassium  - Creatinine    2. Fibroid      3. Hyperkalemia  Given frequent NSAID use, check potassium today which was mildly elevated at 5.2.  Recommend patient hold NSAIDs, will plan to recheck potassium October 26.  As long as it is normal, we will plan to proceed with surgery.    4. Need for vaccination  - Influenza, Seasonal Quad, PF =/> 6months       Risks and Recommendations:  The patient has the following additional risks and recommendations for perioperative complications:   - No identified additional risk factors other than previously addressed    Medication Instructions:  Hold ASA and NSAIDs within 7 days of surgery    RECOMMENDATION:  APPROVAL GIVEN to proceed with proposed procedure pending review of diagnostic evaluation. Recheck potassium 10/26/20.    Signed Electronically by: Rosaura Mullen MD     10/26/20 ADDENDUM: Repeat potassium today now normal at 4.2.  Ok to proceed with surgery.    Rosaura Mullen MD      Copy of this evaluation report is provided to requesting physician.    Preop [facility] Preop Guidelines    Revised Cardiac Risk Index

## 2021-06-12 NOTE — ANESTHESIA POSTPROCEDURE EVALUATION
Patient: Marilyn Neely  Procedure(s):  ROBOTIC HYSTERECTOMY BILATERAL SALPINGECTOMY  CYSTOSCOPY  Anesthesia type: general    Patient location: Phase II Recovery  Last vitals:   Vitals Value Taken Time   /55 10/29/20 1150   Temp 36.4  C (97.6  F) 10/29/20 1150   Pulse 72 10/29/20 1152   Resp 16 10/29/20 1150   SpO2 100 % 10/29/20 1152   Vitals shown include unvalidated device data.  Post vital signs: stable  Level of consciousness: awake and responds to simple questions  Post-anesthesia pain: pain controlled  Post-anesthesia nausea and vomiting: no  Pulmonary: unassisted, return to baseline  Cardiovascular: stable and blood pressure at baseline  Hydration: adequate  Anesthetic events: no    QCDR Measures:  ASA# 11 - Kiana-op Cardiac Arrest: ASA11B - Patient did NOT experience unanticipated cardiac arrest  ASA# 12 - Kiana-op Mortality Rate: ASA12B - Patient did NOT die  ASA# 13 - PACU Re-Intubation Rate: ASA13B - Patient did NOT require a new airway mgmt  ASA# 10 - Composite Anes Safety: ASA10A - No serious adverse event    Additional Notes:

## 2021-06-12 NOTE — ANESTHESIA PREPROCEDURE EVALUATION
Anesthesia Evaluation      History of anesthetic complications (PONV)     Airway   Mallampati: II  Neck ROM: full   Pulmonary - negative ROS    breath sounds clear to auscultation                         Cardiovascular - negative ROS  Exercise tolerance: > or = 4 METS  Rhythm: regular  Rate: normal,         Neuro/Psych      Comments: Migraine headaches    Endo/Other - negative ROS      GI/Hepatic/Renal - negative ROS           Dental - normal exam                        Anesthesia Plan  Planned anesthetic: general endotracheal and total IV anesthesia  Plan: Gen ETT  TIVA given hx of PONV  Pain: Dilaudid and mag infusion  PONV: scopolamine patch, decadron and zofran   ASA 2   Induction: intravenous   Anesthetic plan and risks discussed with: patient  Anesthesia plan special considerations: antiemetics,   Post-op plan: routine recovery

## 2021-06-12 NOTE — ANESTHESIA CARE TRANSFER NOTE
Last vitals:   Vitals:    10/29/20 1040   BP: 111/56   Pulse: 73   Resp: 20   Temp:    SpO2: 100%     Patient's level of consciousness is drowsy  Spontaneous respirations: yes  Maintains airway independently: yes  Dentition unchanged: yes  Oropharynx: oropharynx clear of all foreign objects    QCDR Measures:  ASA# 20 - Surgical Safety Checklist: WHO surgical safety checklist completed prior to induction    PQRS# 430 - Adult PONV Prevention: 4558F - Pt received => 2 anti-emetic agents (different classes) preop & intraop  ASA# 8 - Peds PONV Prevention: NA - Not pediatric patient, not GA or 2 or more risk factors NOT present  PQRS# 424 - Kiana-op Temp Management: 4559F - At least one body temp DOCUMENTED => 35.5C or 95.9F within required timeframe  PQRS# 426 - PACU Transfer Protocol: - Transfer of care checklist used  ASA# 14 - Acute Post-op Pain: ASA14B - Patient did NOT experience pain >= 7 out of 10

## 2021-06-12 NOTE — TELEPHONE ENCOUNTER
Spoke with pt regarding lab results with mildly elevated potassium.  Recommend holding ibuprofen/nsaids now.  Plan to recheck Potassium 10/26.  Please assist patient in scheduling lab appointment for 10/26

## 2021-06-15 NOTE — PROGRESS NOTES
"Assessment:      Healthy female exam.     1. Routine general medical examination at a health care facility     2. Skin lesion  Ambulatory referral to Dermatology   3. Encounter for initial prescription of contraceptive pills  Pregnancy (Beta-hCG, Qual), Urine   4. Screening for lipid disorders  Lipid Stephens FASTING    CANCELED: Lipid Profile   5. Screening for diabetes mellitus (DM)  Glucose   6. Screening for cervical cancer  Gynecologic Cytology (PAP Smear)        Plan:     1. Routine general medical examination at a health care facility    2. Skin lesion  Discussed removal; she would like referral to dermatology for procedure, ordered today  - Ambulatory referral to Dermatology    3. Encounter for initial prescription of contraceptive pills  Counseled patient regarding various birth control options.  She does not have a history of migraine headache with visual aura just migraine headache.  Therefore, will check urine pregnancy test, if this is negative, I will go ahead and fax prescription for Microgestin to try.  Counseled patient on hard to start pack and take medication.  If migraine headaches persist or worsen, recommend discontinuing this medication, could consider progestin only medication to see if this improves her symptoms.  - Pregnancy (Beta-hCG, Qual), Urine    4. Screening for lipid disorders  She is not fasting today, will check at a later date.  - Lipid Stephens FASTING; Future    5. Screening for diabetes mellitus (DM)  Not fasting today, will check a later date.  - Glucose; Future    6. Screening for cervical cancer  Collected, will inform patient of results we have them.  - Gynecologic Cytology (PAP Smear)    7. Mammogram  Ordered today       Await pap smear results.  Mammogram.     Subjective:      Marilyn Neely \"Iona\" is a 43 y.o. female, new to me, hasn't been seen in family medicine in over 3 years, who presents for an annual exam. The patient is sexually active. The patient participates " in regular exercise: yes. The patient reports that there is not domestic violence in her life. Additional concerns:     Birth control: Would like to restart OCP.  Patient does have a history of migraine headache, though does not have visual aura.  She has previously been on OCP, though sometimes she would still experience migraine headaches.  There was discussion about possibly putting her on a minipill, though at that time she decided to try to get pregnant.    Healthy Habits:   Regular Exercise: Yes  Sunscreen Use: Yes  Healthy Diet: Yes  Dental Visits Regularly: Yes  Seat Belt: Yes  Sexually active: Yes  Self Breast Exam Monthly:Yes  Hemoccults: N/A  Flex Sig: N/A  Colonoscopy: N/A  Lipid Profile: not fasting today  Glucose Screen: not fasting today  Prevention of Osteoporosis: N/A  Last Dexa: N/A        Immunization History   Administered Date(s) Administered     Hep B, Adult 2007, 2007     Hep B, historic 2007, 2007     Influenza,[id number],Pandemic,split,IM 2009     Influenza,inj,MDCK,PF,Quad >4yrs 10/03/2017     Tdap 2007, 03/10/2014     Immunization status: up to date and documented.    No exam data present    Gynecologic History  Patient's last menstrual period was 2018 (exact date).  Contraception: none currently  Last Pap: . Results were: normal  Last mammogram: n/a.       OB History    Para Term  AB Living   1 1       SAB TAB Ectopic Multiple Live Births             # Outcome Date GA Lbr Román/2nd Weight Sex Delivery Anes PTL Lv   1 Para                   Current Outpatient Prescriptions   Medication Sig Dispense Refill     ibuprofen (ADVIL,MOTRIN) 200 MG tablet Take 400-600 mg by mouth every 6 (six) hours as needed for pain or headaches.       norethindrone-ethinyl estradiol (MICROGESTIN FE ) 1 mg-20 mcg (21)/75 mg (7) per tablet Take 1 tablet by mouth daily. 84 tablet 3     No current facility-administered medications for this visit.      Past  "Medical History:   Diagnosis Date     Hx of ovarian cyst      Migraine      Motion sickness      Past Surgical History:   Procedure Laterality Date     APPENDECTOMY        SECTION       LAPAROSCOPIC APPENDECTOMY N/A 2016    Procedure: APPENDECTOMY LAPAROSCOPIC;  Surgeon: Adam Do MD;  Location: [facility] Main OR;  Service:      Penicillins  Family History   Problem Relation Age of Onset     Diabetes type II Father      Melanoma Father      Social History     Social History     Marital status:      Spouse name: N/A     Number of children: N/A     Years of education: N/A     Occupational History     Not on file.     Social History Main Topics     Smoking status: Never Smoker     Smokeless tobacco: Never Used     Alcohol use Yes      Comment: 1-3 drinks a week     Drug use: No     Sexual activity: Not Currently     Other Topics Concern     Not on file     Social History Narrative    , lives with  Waqas and daughter savannah (3).  Works full-time as .         Review of Systems  General:  Denies problem  Eyes: Denies problem  Ears/Nose/Throat: Denies problem  Cardiovascular: Denies problem  Respiratory:  Denies problem  Gastrointestinal:  Denies problem  Genitourinary: Denies problem  Musculoskeletal:  Denies problem  Skin: Denies problem  Neurologic: Denies problem  Psychiatric: Denies problem  Endocrine: Denies problem  Heme/Lymphatic: Denies problem   Allergic/Immunologic: Denies problem        Objective:         Vitals:    18 0913   BP: 108/64   Pulse: 60   Resp: 16   Weight: 132 lb 11.2 oz (60.2 kg)   Height: 5' 8.4\" (1.737 m)     Body mass index is 19.94 kg/(m^2).    Physical Exam:  General Appearance: Alert, cooperative, no distress, appears stated age  Head: Normocephalic, without obvious abnormality, atraumatic  Eyes: PERRL, conjunctiva/corneas clear, EOM's intact  Ears: Normal TM's and external ear canals, both ears  Nose: Nares normal, septum " midline,mucosa normal, no drainage  Throat: Lips, mucosa, and tongue normal; teeth and gums normal  Neck: Supple, symmetrical, trachea midline, no adenopathy;  thyroid: not enlarged, symmetric, no tenderness/mass/nodules;   Back: Symmetric, no curvature, ROM normal, no CVA tenderness  Lungs: Clear to auscultation bilaterally, respirations unlabored  Breasts: No breast masses, tenderness, asymmetry, or nipple discharge.  Heart: Regular rate and rhythm, no murmur.   Abdomen: Soft, non-tender, bowel sounds active all four quadrants,  no masses, no organomegaly  Pelvic:Normally developed genitalia with no external lesions or eruptions. Vagina and cervix show no lesions, inflammation, discharge or tenderness.Uterus normal.  No adnexal mass or tenderness.  Extremities: Extremities normal, atraumatic, no cyanosis or edema  Skin: multiple small 1-3mm nevi noted on top of his chest and lower back.  On her right lateral trunk, there is a ~9x5mm hyperpigmented pedunculated lesion.    Lymph nodes: Cervical, supraclavicular, and axillary nodes normal  Neurologic: Alert.   Psych: Normal affect.  Does not appear anxious or depressed.         Rosaura Mullen MD  2/9/2018

## 2021-06-19 ENCOUNTER — HEALTH MAINTENANCE LETTER (OUTPATIENT)
Age: 46
End: 2021-06-19

## 2021-06-23 NOTE — TELEPHONE ENCOUNTER
Refill Approved    Rx renewed per Medication Renewal Policy. Medication was last renewed on last visit 2/9/18.    Hailey Lutz, [facility] Connection Triage/Med Refill 1/11/2019     Requested Prescriptions   Pending Prescriptions Disp Refills     norethindrone-ethinyl estradiol (MICROGESTIN FE 1/20) 1 mg-20 mcg (21)/75 mg (7) per tablet 84 tablet 3     Sig: Take 1 tablet by mouth daily.    Oral Contraceptives Protocol Passed - 1/11/2019 10:26 AM       Passed - Visit with PCP or prescribing provider visit in last 12 months     Last office visit with prescriber/PCP: Visit date not found OR same dept: Visit date not found OR same specialty: Visit date not found  Last physical: 2/9/2018 Last MTM visit: Visit date not found   Next visit within 3 mo: Visit date not found  Next physical within 3 mo: Visit date not found  Prescriber OR PCP: Rosaura Mullen MD  Last diagnosis associated with med order: There are no diagnoses linked to this encounter.  If protocol passes may refill for 12 months if within 3 months of last provider visit (or a total of 15 months).

## 2021-06-29 NOTE — PROGRESS NOTES
"Progress Notes by Rosaura Mullen MD at 7/20/2020  1:00 PM     Author: Rosaura Mullen MD Service: -- Author Type: Physician    Filed: 7/20/2020  1:44 PM Encounter Date: 7/20/2020 Status: Signed    : Rosaura Mullen MD (Physician)       FEMALE PREVENTATIVE EXAM    Assessment and Plan:       1. Routine general medical examination at a health care facility  Discussed one-time HIV screening, ordered today  - HIV Antigen/Antibody Screening Cascade    2. Abdominal fullness  3. Abdominal bloating  Weight gain  Given patient has had approximately 10 pound weight gain as well as ongoing fullness, bloating, and mild tenderness palpation, would recommend we check labs as noted below as well as CT scan of abdomen and pelvis.  Will discuss additional plan once we have these results.  Patient understands, agrees with plan.  - HM2(CBC w/o Differential)  - Hepatic Profile  - Comprehensive Metabolic Panel  - Amylase  - Lipase  - Thyroid Cascade  - CT Abdomen Pelvis With Oral With Without IV Contrast; Future  - CT Abdomen Pelvis With Oral With Without IV Contrast    4. Encounter for screening for diabetes mellitus  5. Encounter for screening for lipoid disorders  Fasting today, will check labs.  - Glucose  - Lipid Cascade- FASTING     6. Visit for screening mammogram:  Ordered mammogram    Next follow up:  Return in about 1 month (around 8/20/2020) for Follow Up.    Immunization Review  Adult Imm Review: No immunizations due today      I discussed the following with the patient:   Adult Healthy Living: Importance of regular exercise  Healthy nutrition        Subjective:   Chief Complaint: Marilyn Neely is an 45 y.o. female here for a preventative health visit.     HPI:  Additional concerns:    Since approximately February 2020, feels abdomen is larger.  Has occasional pain and cramping, feeling bloated \"all the time.\" Tried staying away from acidic foods including garlic. Tried going off wheat and dairy, and didn't feel it " made much impact.  When exercising, feels there's some lightheadedness when doing sit-up for example.  Has been ongoing since February 2020.  Feels she has had about 10lb unexplained weight gain.  Bowel movements are daily; does pay attention to make sure she's getting enough fiber.  No fevers, chills, sweats.          Healthy Habits  Are you taking a daily aspirin? No  Do you typically exercising at least 40 min, 3-4 times per week?  Yes  Do you usually eat at least 4 servings of fruit and vegetables a day, include whole grains and fiber and avoid regularly eating high fat foods? Yes  Have you had an eye exam in the past two years? NO  Do you see a dentist twice per year? Yes  Do you have any concerns regarding sleep? No    Safety Screen  If you own firearms, are they secured in a locked gun cabinet or with trigger locks? The patient does not own any firearms  Do you feel you are safe where you are living?: Yes (7/20/2020 12:55 PM)  Do you feel you are safe in your relationship(s)?: Yes (7/20/2020 12:55 PM)      Review of Systems:  Please see above.  The rest of the review of systems are negative for all systems.     Pap History:   No - age 30-65 PAP every 3 years recommended  Cancer Screening       Status Date      MAMMOGRAM Overdue 3/19/2019      Done 3/19/2018 MAMMO DIAGNOSTIC RIGHT     Patient has more history with this topic...    PAP SMEAR Next Due 2/9/2023      Done 2/9/2018 GYNECOLOGIC CYTOLOGY (PAP SMEAR)     Patient has more history with this topic...          Patient Care Team:  Rosaura Mullen MD as PCP - General (Family Medicine)  Rosaura Mullen MD as Assigned PCP        History     Reviewed By Date/Time Sections Reviewed    Rosaura Mullen MD 7/20/2020  1:16 PM Tobacco, Alcohol, Drug Use, Sexual Activity, Social Documentation    Rosaura Mullen MD 7/20/2020  1:15 PM Medical, Surgical, Family    Julianna Gallardo CMA 7/20/2020 12:56 PM Tobacco            Objective:   Vital Signs:   Visit Vitals  BP  "122/76 (Patient Site: Right Arm, Patient Position: Sitting, Cuff Size: Adult Regular)   Pulse 61   Temp 98.5  F (36.9  C) (Tympanic)   Resp 16   Ht 5' 8.75\" (1.746 m)   Wt 143 lb 12.8 oz (65.2 kg)   LMP 06/22/2020 (Exact Date)   SpO2 100%   Breastfeeding No   BMI 21.39 kg/m           PHYSICAL EXAM  General Appearance: Alert, cooperative, no distress, appears stated age  Head: Normocephalic, without obvious abnormality, atraumatic  Eyes: PERRL, conjunctiva/corneas clear, EOM's intact  Ears: Normal TM's and external ear canals, both ears  Nose: Nares normal, septum midline,mucosa normal, no drainage  Throat: Lips, mucosa, and tongue normal; teeth and gums normal  Neck: Supple, symmetrical, trachea midline, no adenopathy;  thyroid: not enlarged, symmetric, no tenderness/mass/nodules;   Back: Symmetric, no curvature, ROM normal, no CVA tenderness  Lungs: Clear to auscultation bilaterally, respirations unlabored  Breasts: No breast masses, tenderness, asymmetry, or nipple discharge.  Heart: Regular rate and rhythm, no murmur.   Abdomen: Soft, bowel sounds active all four quadrants,  no masses, no organomegaly, mild tenderness to palpation in RLQ, though no rebound or guarding  Pelvic:Normally developed genitalia with no external lesions or eruptions. Vagina and cervix show no lesions, inflammation, discharge or tenderness. No cystocele, No rectocele. Uterus normal.  No adnexal mass or tenderness.  Extremities: Extremities normal, atraumatic, no cyanosis or edema  Skin: Skin color, texture, turgor normal, no rashes or lesions  Lymph nodes: Cervical, supraclavicular, and axillary nodes normal  Neurologic: Alert.   Psych: Normal affect.  Does not appear anxious or depressed.        The 10-year ASCVD risk score (Marcellus SAMIR Jr., et al., 2013) is: 0.4%    Values used to calculate the score:      Age: 45 years      Sex: Female      Is Non- : No      Diabetic: No      Tobacco smoker: No      Systolic Blood " Pressure: 122 mmHg      Is BP treated: No      HDL Cholesterol: 57 mg/dL      Total Cholesterol: 138 mg/dL         Medication List          Accurate as of July 20, 2020  1:43 PM. If you have any questions, ask your nurse or doctor.            CONTINUE taking these medications    ibuprofen 200 MG tablet  Also known as:  ADVIL,MOTRIN  INSTRUCTIONS:  Take 400-600 mg by mouth every 6 (six) hours as needed for pain or headaches.        levonorgestrel-ethinyl estradiol 0.15 mg-30 mcg (91) per tablet  Also known as:  SEASONALE  INSTRUCTIONS:  Take 1 tablet by mouth daily.  Doctor's comments:  Needs appointment before additional refills.        metoclopramide 10 MG tablet  Also known as:  REGLAN  INSTRUCTIONS:  Take 10 mg by mouth daily as needed for nausea.        SUMAtriptan 50 MG tablet  Also known as:  IMITREX  INSTRUCTIONS:  Take 50 mg by mouth every 2 (two) hours as needed.               Additional Screenings Completed Today:

## 2021-07-03 NOTE — ADDENDUM NOTE
Addendum Note by Mckayla Giron, RN at 8/20/2020  7:15 AM     Author: Mckayla Giron RN Service: -- Author Type: Registered Nurse    Filed: 9/9/2020  9:32 AM Encounter Date: 8/20/2020 Status: Signed    : Mckayla Giron RN (Registered Nurse)    Addended by: MCKAYLA GIRON on: 9/9/2020 09:32 AM        Modules accepted: Orders

## 2021-10-09 ENCOUNTER — HEALTH MAINTENANCE LETTER (OUTPATIENT)
Age: 46
End: 2021-10-09

## 2021-12-04 ENCOUNTER — E-VISIT (OUTPATIENT)
Dept: INTERNAL MEDICINE | Facility: CLINIC | Age: 46
End: 2021-12-04
Payer: COMMERCIAL

## 2021-12-04 DIAGNOSIS — Z20.822 CLOSE EXPOSURE TO 2019 NOVEL CORONAVIRUS: Primary | ICD-10-CM

## 2021-12-04 PROCEDURE — 99421 OL DIG E/M SVC 5-10 MIN: CPT | Performed by: PHYSICIAN ASSISTANT

## 2021-12-04 NOTE — PATIENT INSTRUCTIONS
"  Dear Marilyn Neely,    Based on your exposure to COVID-19 (coronavirus), we would like to test you for this virus. I have placed an order for this test.The best time for testing is 5-7 days after the exposure.    How to schedule:  Go to your [facility] home page and scroll down to the section that says  You have an appointment that needs to be scheduled  and click the large green button that says  Schedule Now  and follow the steps to find the next available opening.     If you are unable to complete these [facility] scheduling steps, please call [phone] to schedule your testing.     Return to work/school/ guidance:   For people with high risk exposures outside the home    Please let your workplace manager and staffing office know when your quarantine ends.     We can not give you an exact date as it depends on the information below. You can calculate this on your own or work with your manager/staffing office to calculate this. (For example if you were exposed on 10/4, you would have to quarantine for 14 full days. That would be through 10/18. You could return on 10/19.)    Quarantine Guidelines:  Patients (\"contacts\") who have been in close prolonged contact of an infected person(s) (within six feet for at least 15 minutes within a 24 hour period), and remain asymptomatic should enter quarantine based on the following options:    14-day quarantine period (this remains the CDC recommendation for the greatest protection against spread of COVID-19) OR    Minimum 7-day quarantine with negative RT-PCR test collected on day 5 or later OR    10-day quarantine with no test  Quarantine Guideline exceptions are as follows:    People who have been fully vaccinated do not need to quarantine if the exposure was at least 2 weeks after the last vaccination. This includes vaccinated health care workers.    Not fully vaccinated and unvaccinated Individuals who work in health care, congregate care, or congregate living " should be off work for 14 days from their last date of exposure. Community activities for this group can be resumed based on options above. Fully vaccinated individuals in this group do not need to quarantine from work after exposure.    Not fully vaccinated and unvaccinated people whose high-risk exposure was a household member should always quarantine for 14 days from their last date of exposure. Fully vaccinated people in this category do not need to quarantine.    Not fully vaccinated or unvaccinated residents of congregate care and congregate living settings should always quarantine for 14 days from their last date of exposure. Fully vaccinated residents do not need to quarantine.  Note: If you have ongoing exposure to the covid positive person, this quarantine period may be more than 14 days. (For example, if you are continued to be exposed to your child who tested positive and cannot isolate from them, then the quarantine of 7-14 days can't start until your child is no longer contagious. This is typically 10 days from onset of the child's symptoms. So the total duration may be 17-24 days in this case.)    You should continue symptom monitoring until day 14 post-exposure. If you develop signs or symptoms of COVID-19, isolate and get tested (even if you have been tested already).    How to quarantine:   Stay home and away from others. Don't go to school or anywhere else. Generally quarantine means staying home from work but there are some exceptions to this. Please contact your workplace.  No hugging, kissing or shaking hands.  Don't let anyone visit.  Cover your mouth and nose with a mask, tissue or washcloth to avoid spreading germs.  Wash your hands and face often. Use soap and water.    What are the symptoms of COVID-19?  The most common symptoms are cough, fever and trouble breathing. Less common symptoms include headache, body aches, fatigue (feeling very tired), chills, sore throat, stuffy or runny nose,  diarrhea (loose poop), loss of taste or smell, belly pain, and nausea or vomiting (feeling sick to your stomach or throwing up).  After 14 days, if you have still don't have symptoms, you likely don't have this virus.  If you develop symptoms, follow these guidelines.  If you're normally healthy: Please start another eVisit.  If you have a serious health problem (like cancer, heart failure, an organ transplant or kidney disease): Call your specialty clinic. Let them know that you might have COVID-19.    Where can I get more information?  [facility] - About COVID-19: [email or web address]  CDC - What to Do If You're Sick: www.cdc.gov/coronavirus/2019-ncov/about/steps-when-sick.html  CDC - Ending Home Isolation: www.cdc.gov/coronavirus/2019-ncov/hcp/disposition-in-home-patients.html  CDC - Caring for Someone: www.cdc.gov/coronavirus/2019-ncov/if-you-are-sick/care-for-someone.html  [facility] clinical trials (COVID-19 research studies): clinicalaffairs.[facility]-clinical-trials  Below are the COVID-19 hotlines at the [facility] ([facility]). Interpreters are available.  For health questions: Call 546-542-2783 or 1-575.342.7645 (7 a.m. to 7 p.m.)  For questions about schools and childcare: Call 055-108-8969 or 1-762.272.8824 (7 a.m. to 7 p.m.)        December 4, 2021  RE:  Marilyn Neely                                                                                                                   [address] [address]      To whom it may concern:    I evaluated Marilyn Neely on December 4, 2021. Marilyn Neely should be excused from work/school.    They should let their workplace manager and staffing office know when their quarantine ends.    We can not give an exact date as it depends on the information below. They can calculate this on their own or work with their manager/staffing office to calculate this. (For example if they were exposed on 10/04, they  "would have to quarantine for 14 full days. That would be through 10/18. They could return on 10/19.)    Quarantine Guidelines:    Patients (\"contacts\") who have been in close prolonged contact of an infected person(s) (within six feet for at least 15 minutes within a 24 hour period) and remain asymptomatic should enter quarantine based on the following options:      14-day quarantine period (this remains the CDC recommendation for the greatest protection against spread of COVID-19) OR    Minimum 7-day quarantine with negative RT-PCR test collected on day 5 or later OR    10-day quarantine with no test   Quarantine Guideline exceptions are as follows:    People who have been fully vaccinated do not need to quarantine if the exposure was at least 2 weeks after the last vaccination. This includes vaccinated health care workers.    Not fully vaccinated and unvaccinated Individuals who work in health care, congregate care, or congregate living should be off work for 14 days from their last date of exposure. Community activities for this group can be resumed based on options above. Fully vaccinated individuals in this group do not need to quarantine from work after exposure.    Not fully vaccinated and unvaccinated people whose high-risk exposure was a household member should always quarantine for 14 days from their last date of exposure. Fully vaccinated people in this category do not need to quarantine.    Not fully vaccinated or unvaccinated residents of congregate care and congregate living settings should always quarantine for 14 days from their last date of exposure. Fully vaccinated residents do not need to quarantine.    Note: If there is ongoing exposure to the covid positive person, this quarantine period may be longer than 14 days. (For example, if they are continually exposed to their child, who tested positive and cannot isolate from them, then the quarantine of 7-14 days can't start until their child is no " longer contagious. This is typically 10 days from onset to the child's symptoms. So the total duration may be 17-24 days in this case.)    Marilyn CURRY [place] should continue symptom monitoring until day 14 post-exposure. If they develop signs or symptoms of COVID-19, they should isolate and get tested (even if they have been tested already).    Sincerely,  Romero Ronquillo PA-C

## 2022-07-10 ENCOUNTER — HEALTH MAINTENANCE LETTER (OUTPATIENT)
Age: 47
End: 2022-07-10

## 2022-09-11 ENCOUNTER — HEALTH MAINTENANCE LETTER (OUTPATIENT)
Age: 47
End: 2022-09-11

## 2023-01-22 ENCOUNTER — HEALTH MAINTENANCE LETTER (OUTPATIENT)
Age: 48
End: 2023-01-22

## 2023-07-29 ENCOUNTER — HEALTH MAINTENANCE LETTER (OUTPATIENT)
Age: 48
End: 2023-07-29

## 2024-02-24 ENCOUNTER — HEALTH MAINTENANCE LETTER (OUTPATIENT)
Age: 49
End: 2024-02-24